# Patient Record
Sex: FEMALE | Race: AMERICAN INDIAN OR ALASKA NATIVE
[De-identification: names, ages, dates, MRNs, and addresses within clinical notes are randomized per-mention and may not be internally consistent; named-entity substitution may affect disease eponyms.]

---

## 2018-07-23 ENCOUNTER — HOSPITAL ENCOUNTER (EMERGENCY)
Dept: HOSPITAL 5 - ED | Age: 23
Discharge: HOME | End: 2018-07-23
Payer: COMMERCIAL

## 2018-07-23 VITALS — SYSTOLIC BLOOD PRESSURE: 112 MMHG | DIASTOLIC BLOOD PRESSURE: 69 MMHG

## 2018-07-23 DIAGNOSIS — Z90.89: ICD-10-CM

## 2018-07-23 DIAGNOSIS — J03.90: Primary | ICD-10-CM

## 2018-07-23 DIAGNOSIS — R51: ICD-10-CM

## 2018-07-23 DIAGNOSIS — M79.1: ICD-10-CM

## 2018-07-23 PROCEDURE — 96372 THER/PROPH/DIAG INJ SC/IM: CPT

## 2018-07-23 PROCEDURE — 99283 EMERGENCY DEPT VISIT LOW MDM: CPT

## 2018-07-23 PROCEDURE — 87116 MYCOBACTERIA CULTURE: CPT

## 2018-07-23 PROCEDURE — 87430 STREP A AG IA: CPT

## 2018-07-23 NOTE — EMERGENCY DEPARTMENT REPORT
ED ENT HPI





- General


Chief complaint: Sore Throat


Stated complaint: FEVER/HEAD ACHE


Time Seen by Provider: 18 21:57


Source: patient


Mode of arrival: Ambulatory


Limitations: No Limitations





- History of Present Illness


Initial comments: 


22-year-old female past medical history recurrent tonsillitis presents with 

complaint of 2 days of body aches and throat aching.  Patient is speaking in 

full sentences noticeable trismus noted drooling able to tolerate liquids but 

has some pain with solids.  Patient is awake alert and oriented 3.  States she 

has mild headache and slight body aches.  Sick contacts or recent travel.  No 

tripoding or pyramiding behavior noted on exam


MD complaint: sore throat


Onset/Timin


-: days(s)


Location: throat


Severity: moderate


Severity scale (0 -10): 6


Quality: aching


Consistency: intermittent


Associated Symptoms: sore throat





- Related Data


 Previous Rx's











 Medication  Instructions  Recorded  Last Taken  Type


 


Dextromethorphan/Benzocaine 1 each PO Q4H PRN #1 lozenge 18 Unknown Rx





[Cepacol Sorethroat-Cough Danya]    


 


Ibuprofen [Motrin] 800 mg PO Q8HR PRN #25 tablet 18 Unknown Rx











 Allergies











Allergy/AdvReac Type Severity Reaction Status Date / Time


 


No Known Allergies Allergy   Unverified 18 17:23














ED Dental HPI





- General


Chief complaint: Sore Throat


Stated complaint: FEVER/HEAD ACHE


Time Seen by Provider: 18 21:57


Source: patient


Mode of arrival: Ambulatory


Limitations: No Limitations





- Related Data


 Previous Rx's











 Medication  Instructions  Recorded  Last Taken  Type


 


Dextromethorphan/Benzocaine 1 each PO Q4H PRN #1 lozenge 18 Unknown Rx





[Cepacol Sorethroat-Cough Danya]    


 


Ibuprofen [Motrin] 800 mg PO Q8HR PRN #25 tablet 18 Unknown Rx











 Allergies











Allergy/AdvReac Type Severity Reaction Status Date / Time


 


No Known Allergies Allergy   Unverified 18 17:23














ED Review of Systems


ROS: 


Stated complaint: FEVER/HEAD ACHE


Other details as noted in HPI





Constitutional: denies: chills, fever


Eyes: denies: eye pain, eye discharge, vision change


ENT: throat pain.  denies: ear pain


Respiratory: denies: cough, shortness of breath, wheezing


Cardiovascular: denies: chest pain, palpitations


Endocrine: no symptoms reported


Gastrointestinal: denies: abdominal pain, nausea, diarrhea


Genitourinary: denies: urgency, dysuria, discharge


Musculoskeletal: denies: back pain, joint swelling, arthralgia


Skin: denies: rash, lesions


Neurological: denies: headache, weakness, paresthesias


Psychiatric: denies: anxiety, depression


Hematological/Lymphatic: denies: easy bleeding, easy bruising





ED Past Medical Hx





- Past Medical History


Previous Medical History?: Yes


Additional medical history: Gestational diabetes





- Surgical History


Past Surgical History?: Yes


Additional Surgical History: Adenoids





- Social History


Smoking Status: Never Smoker


Substance Use Type: None





- Medications


Home Medications: 


 Home Medications











 Medication  Instructions  Recorded  Confirmed  Last Taken  Type


 


Dextromethorphan/Benzocaine 1 each PO Q4H PRN #1 lozenge 18  Unknown Rx





[Cepacol Sorethroat-Cough Danya]     


 


Ibuprofen [Motrin] 800 mg PO Q8HR PRN #25 tablet 18  Unknown Rx














ED Physical Exam





- General


Limitations: No Limitations


General appearance: alert, in no apparent distress





- Head


Head exam: Present: atraumatic, normocephalic





- Eye


Eye exam: Present: normal appearance, PERRL, EOMI





- ENT


ENT exam: Present: mucous membranes moist





- Expanded ENT Exam


  ** Expanded


Throat exam: Positive: tonsillar erythema, tonsillomegaly, tonsillar exudate (

bilateral tonsillar exudates and tonsillar erythema.  No peritonsillar abscess 

noted.  Oropharynx is patent.)





- Neck


Neck exam: Present: normal inspection, lymphadenopathy (some tender anterior 

cervical adenopathy bilaterally)





- Respiratory


Respiratory exam: Present: normal lung sounds bilaterally.  Absent: respiratory 

distress





- Cardiovascular


Cardiovascular Exam: Present: regular rate, normal rhythm.  Absent: systolic 

murmur, diastolic murmur, rubs, gallop





- GI/Abdominal


GI/Abdominal exam: Present: soft, normal bowel sounds





- Extremities Exam


Extremities exam: Present: normal inspection





- Back Exam


Back exam: Present: normal inspection





- Neurological Exam


Neurological exam: Present: alert, oriented X3





- Psychiatric


Psychiatric exam: Present: normal affect, normal mood





- Skin


Skin exam: Present: warm, dry, intact, normal color.  Absent: rash





ED Course


 Vital Signs











  18





  17:21


 


Temperature 98.4 F


 


Pulse Rate 99 H


 


Respiratory 16





Rate 


 


Blood Pressure 112/69


 


O2 Sat by Pulse 99





Oximetry 














ED Medical Decision Making





- Medical Decision Making


A/P: Tonsillitis


1-empiric treatment with Bicillin, Decadron 10 mg IM, Motrin


2-throat lozenges, Motrin when necessary


3-pt states she has follow-up with ENT within the next 2-3 days as she already 

made an appointment


4- advised patient to return to the ER for fevers chills drooling inability to 

tolerate any liquid by mouth or any difficulty breathing.  Patient has no 

clinical signs of Michael's angina or peritonsillar abscess at this time area 

patient has a history of recurrent tonsillitis states she has had an 

adenoidectomy in the past.


Critical care attestation.: 


If time is entered above; I have spent that time in minutes in the direct care 

of this critically ill patient, excluding procedure time.








ED Disposition


Clinical Impression: 


 Tonsillitis





Disposition: - TO HOME OR SELFCARE


Is pt being admited?: No


Does the pt Need Aspirin: No


Condition: Stable


Instructions:  Tonsillitis (ED)


Prescriptions: 


Dextromethorphan/Benzocaine [Cepacol Sorethroat-Cough Danya] 1 each PO Q4H PRN #1 

lozenge


 PRN Reason: Sore Throat


Ibuprofen [Motrin] 800 mg PO Q8HR PRN #25 tablet


 PRN Reason: Sore Throat


Referrals: 


[facility] [Provider Group] - 3-5 Days


[facility] - 3-5 Days


[facility] [Provider Group] - 3-5 Days


Forms:  Work/School Release Form(ED)


Time of Disposition: 23:08